# Patient Record
Sex: FEMALE | Race: WHITE | NOT HISPANIC OR LATINO | Employment: FULL TIME | URBAN - METROPOLITAN AREA
[De-identification: names, ages, dates, MRNs, and addresses within clinical notes are randomized per-mention and may not be internally consistent; named-entity substitution may affect disease eponyms.]

---

## 2017-04-25 ENCOUNTER — HISTORICAL (OUTPATIENT)
Dept: RADIOLOGY | Facility: HOSPITAL | Age: 52
End: 2017-04-25

## 2019-02-25 ENCOUNTER — HISTORICAL (OUTPATIENT)
Dept: ADMINISTRATIVE | Facility: HOSPITAL | Age: 54
End: 2019-02-25

## 2019-02-25 LAB
ABS NEUT (OLG): 11.7 X10(3)/MCL (ref 2.1–9.2)
ALBUMIN SERPL-MCNC: 4.4 GM/DL (ref 3.4–5)
ALBUMIN/GLOB SERPL: 1.57 {RATIO} (ref 1.5–2.5)
ALP SERPL-CCNC: 86 UNIT/L (ref 38–126)
ALT SERPL-CCNC: 11 UNIT/L (ref 7–52)
APPEARANCE, UA: CLEAR
AST SERPL-CCNC: 14 UNIT/L (ref 15–37)
BACTERIA #/AREA URNS AUTO: ABNORMAL /HPF
BILIRUB SERPL-MCNC: 0.4 MG/DL (ref 0.2–1)
BILIRUB UR QL STRIP: NEGATIVE MG/DL
BILIRUBIN DIRECT+TOT PNL SERPL-MCNC: 0.1 MG/DL (ref 0–0.5)
BILIRUBIN DIRECT+TOT PNL SERPL-MCNC: 0.3 MG/DL
BUN SERPL-MCNC: 14 MG/DL (ref 7–18)
CALCIUM SERPL-MCNC: 8.6 MG/DL (ref 8.5–10)
CHLORIDE SERPL-SCNC: 105 MMOL/L (ref 98–107)
CO2 SERPL-SCNC: 22 MMOL/L (ref 21–32)
COLOR UR: YELLOW
CREAT SERPL-MCNC: 0.86 MG/DL (ref 0.6–1.3)
ERYTHROCYTE [DISTWIDTH] IN BLOOD BY AUTOMATED COUNT: 14.1 % (ref 11.5–17)
GLOBULIN SER-MCNC: 2.8 GM/DL (ref 1.2–3)
GLUCOSE (UA): NEGATIVE MG/DL
GLUCOSE SERPL-MCNC: 103 MG/DL (ref 74–106)
HCT VFR BLD AUTO: 48 % (ref 37–47)
HGB BLD-MCNC: 15 GM/DL (ref 12–16)
HGB UR QL STRIP: ABNORMAL UNIT/L
KETONES UR QL STRIP: NEGATIVE MG/DL
LEUKOCYTE ESTERASE UR QL STRIP: NEGATIVE UNIT/L
LYMPHOCYTES # BLD AUTO: 2.3 X10(3)/MCL (ref 0.6–3.4)
LYMPHOCYTES NFR BLD AUTO: 16.5 % (ref 13–40)
MCH RBC QN AUTO: 29.5 PG (ref 27–31.2)
MCHC RBC AUTO-ENTMCNC: 31 GM/DL (ref 32–36)
MCV RBC AUTO: 94 FL (ref 80–94)
MONOCYTES # BLD AUTO: 0.2 X10(3)/MCL (ref 0.1–1.3)
MONOCYTES NFR BLD AUTO: 1.4 % (ref 0.1–24)
NEUTROPHILS NFR BLD AUTO: 82.1 % (ref 47–80)
NITRITE UR QL STRIP.AUTO: NEGATIVE
PH UR STRIP: 6 [PH]
PLATELET # BLD AUTO: 262 X10(3)/MCL (ref 130–400)
PMV BLD AUTO: 10 FL (ref 9.4–12.4)
POTASSIUM SERPL-SCNC: 3.8 MMOL/L (ref 3.5–5.1)
PROT SERPL-MCNC: 7.2 GM/DL (ref 6.4–8.2)
PROT UR QL STRIP: NEGATIVE MG/DL
RBC # BLD AUTO: 5.09 X10(6)/MCL (ref 4.2–5.4)
RBC #/AREA URNS HPF: ABNORMAL /HPF
SODIUM SERPL-SCNC: 138 MMOL/L (ref 136–145)
SP GR UR STRIP: 1.02
SQUAMOUS EPITHELIAL, UA: ABNORMAL /LPF
T4 FREE SERPL-MCNC: 0.78 NG/DL (ref 0.76–1.46)
TSH SERPL-ACNC: 1.66 MIU/ML (ref 0.35–4.94)
UROBILINOGEN UR STRIP-ACNC: 0.2 MG/DL
WBC # SPEC AUTO: 14.2 X10(3)/MCL (ref 4.5–11.5)
WBC #/AREA URNS AUTO: ABNORMAL /[HPF]

## 2019-03-12 ENCOUNTER — HISTORICAL (OUTPATIENT)
Dept: LAB | Facility: HOSPITAL | Age: 54
End: 2019-03-12

## 2019-03-12 ENCOUNTER — HISTORICAL (OUTPATIENT)
Dept: ADMINISTRATIVE | Facility: HOSPITAL | Age: 54
End: 2019-03-12

## 2019-03-12 LAB
ABS NEUT (OLG): 5.3 X10(3)/MCL (ref 2.1–9.2)
APPEARANCE, UA: ABNORMAL
BACTERIA #/AREA URNS AUTO: ABNORMAL /HPF
BILIRUB UR QL STRIP: NEGATIVE MG/DL
COLOR UR: YELLOW
ERYTHROCYTE [DISTWIDTH] IN BLOOD BY AUTOMATED COUNT: 14 % (ref 11.5–17)
GLUCOSE (UA): NEGATIVE MG/DL
HCT VFR BLD AUTO: 43.7 % (ref 37–47)
HGB BLD-MCNC: 14.5 GM/DL (ref 12–16)
HGB UR QL STRIP: ABNORMAL UNIT/L
KETONES UR QL STRIP: NEGATIVE MG/DL
LEUKOCYTE ESTERASE UR QL STRIP: NEGATIVE UNIT/L
LYMPHOCYTES # BLD AUTO: 2 X10(3)/MCL (ref 0.6–3.4)
LYMPHOCYTES NFR BLD AUTO: 26.6 % (ref 13–40)
MCH RBC QN AUTO: 29.7 PG (ref 27–31.2)
MCHC RBC AUTO-ENTMCNC: 33 GM/DL (ref 32–36)
MCV RBC AUTO: 89 FL (ref 80–94)
MONOCYTES # BLD AUTO: 0.3 X10(3)/MCL (ref 0.1–1.3)
MONOCYTES NFR BLD AUTO: 3.9 % (ref 0.1–24)
NEUTROPHILS NFR BLD AUTO: 69.5 % (ref 47–80)
NITRITE UR QL STRIP.AUTO: NEGATIVE
PH UR STRIP: 6.5 [PH]
PLATELET # BLD AUTO: 269 X10(3)/MCL (ref 130–400)
PMV BLD AUTO: 10.2 FL (ref 9.4–12.4)
PROT UR QL STRIP: NEGATIVE MG/DL
RBC # BLD AUTO: 4.89 X10(6)/MCL (ref 4.2–5.4)
RBC #/AREA URNS HPF: ABNORMAL /HPF
SP GR UR STRIP: 1.02
SQUAMOUS EPITHELIAL, UA: ABNORMAL /LPF
UROBILINOGEN UR STRIP-ACNC: 1 MG/DL
WBC # SPEC AUTO: 7.6 X10(3)/MCL (ref 4.5–11.5)
WBC #/AREA URNS AUTO: ABNORMAL /[HPF]

## 2019-03-14 LAB — FINAL CULTURE: NORMAL

## 2020-02-20 ENCOUNTER — HISTORICAL (OUTPATIENT)
Dept: ADMINISTRATIVE | Facility: HOSPITAL | Age: 55
End: 2020-02-20

## 2020-02-20 LAB
ABS NEUT (OLG): 5.7 X10(3)/MCL (ref 2.1–9.2)
ALBUMIN SERPL-MCNC: 4.6 GM/DL (ref 3.4–5)
ALBUMIN/GLOB SERPL: 1.77 {RATIO} (ref 1.5–2.5)
ALP SERPL-CCNC: 91 UNIT/L (ref 38–126)
ALT SERPL-CCNC: 10 UNIT/L (ref 7–52)
AST SERPL-CCNC: 13 UNIT/L (ref 15–37)
BILIRUB SERPL-MCNC: 0.4 MG/DL (ref 0.2–1)
BILIRUBIN DIRECT+TOT PNL SERPL-MCNC: 0.1 MG/DL (ref 0–0.5)
BILIRUBIN DIRECT+TOT PNL SERPL-MCNC: 0.3 MG/DL
BUN SERPL-MCNC: 16 MG/DL (ref 7–18)
CALCIUM SERPL-MCNC: 9.4 MG/DL (ref 8.5–10)
CHLORIDE SERPL-SCNC: 105 MMOL/L (ref 98–107)
CHOLEST SERPL-MCNC: 267 MG/DL (ref 0–200)
CHOLEST/HDLC SERPL: 5.3 {RATIO}
CO2 SERPL-SCNC: 27 MMOL/L (ref 21–32)
CREAT SERPL-MCNC: 1.03 MG/DL (ref 0.6–1.3)
ERYTHROCYTE [DISTWIDTH] IN BLOOD BY AUTOMATED COUNT: 13.7 % (ref 11.5–17)
GLOBULIN SER-MCNC: 2.6 GM/DL (ref 1.2–3)
GLUCOSE SERPL-MCNC: 91 MG/DL (ref 74–106)
HCT VFR BLD AUTO: 45.5 % (ref 37–47)
HDLC SERPL-MCNC: 50 MG/DL (ref 35–60)
HGB BLD-MCNC: 14.8 GM/DL (ref 12–16)
LDLC SERPL CALC-MCNC: 199 MG/DL (ref 0–129)
LYMPHOCYTES # BLD AUTO: 3.2 X10(3)/MCL (ref 0.6–3.4)
LYMPHOCYTES NFR BLD AUTO: 34.5 % (ref 13–40)
MCH RBC QN AUTO: 28.3 PG (ref 27–31.2)
MCHC RBC AUTO-ENTMCNC: 32 GM/DL (ref 32–36)
MCV RBC AUTO: 87 FL (ref 80–94)
MONOCYTES # BLD AUTO: 0.3 X10(3)/MCL (ref 0.1–1.3)
MONOCYTES NFR BLD AUTO: 3 % (ref 0.1–24)
NEUTROPHILS NFR BLD AUTO: 62.5 % (ref 47–80)
PLATELET # BLD AUTO: 247 X10(3)/MCL (ref 130–400)
PMV BLD AUTO: 9.8 FL (ref 9.4–12.4)
POTASSIUM SERPL-SCNC: 4.4 MMOL/L (ref 3.5–5.1)
PROT SERPL-MCNC: 7.2 GM/DL (ref 6.4–8.2)
RBC # BLD AUTO: 5.23 X10(6)/MCL (ref 4.2–5.4)
SODIUM SERPL-SCNC: 140 MMOL/L (ref 136–145)
T4 FREE SERPL-MCNC: 1.19 NG/DL (ref 0.76–1.46)
TRIGL SERPL-MCNC: 175 MG/DL (ref 30–150)
TSH SERPL-ACNC: 2.53 MIU/ML (ref 0.35–4.94)
VLDLC SERPL CALC-MCNC: 35 MG/DL
WBC # SPEC AUTO: 9.2 X10(3)/MCL (ref 4.5–11.5)

## 2021-04-07 ENCOUNTER — HISTORICAL (OUTPATIENT)
Dept: ADMINISTRATIVE | Facility: HOSPITAL | Age: 56
End: 2021-04-07

## 2021-04-07 LAB
ABS NEUT (OLG): 6.5 X10(3)/MCL (ref 2.1–9.2)
ALBUMIN SERPL-MCNC: 4.5 GM/DL (ref 3.4–5)
ALBUMIN/GLOB SERPL: 1.73 {RATIO} (ref 1.5–2.5)
ALP SERPL-CCNC: 91 UNIT/L (ref 38–126)
ALT SERPL-CCNC: 11 UNIT/L (ref 7–52)
APPEARANCE, UA: CLEAR
AST SERPL-CCNC: 13 UNIT/L (ref 15–37)
BACTERIA #/AREA URNS AUTO: ABNORMAL /HPF
BILIRUB SERPL-MCNC: 0.4 MG/DL (ref 0.2–1)
BILIRUB UR QL STRIP: NEGATIVE MG/DL
BILIRUBIN DIRECT+TOT PNL SERPL-MCNC: 0.1 MG/DL (ref 0–0.5)
BILIRUBIN DIRECT+TOT PNL SERPL-MCNC: 0.3 MG/DL
BUN SERPL-MCNC: 14 MG/DL (ref 7–18)
CALCIUM SERPL-MCNC: 9.7 MG/DL (ref 8.5–10)
CHLORIDE SERPL-SCNC: 105 MMOL/L (ref 98–107)
CHOLEST SERPL-MCNC: 268 MG/DL (ref 0–200)
CHOLEST/HDLC SERPL: 4.9 {RATIO}
CO2 SERPL-SCNC: 25 MMOL/L (ref 21–32)
COLOR UR: YELLOW
CREAT SERPL-MCNC: 0.98 MG/DL (ref 0.6–1.3)
ERYTHROCYTE [DISTWIDTH] IN BLOOD BY AUTOMATED COUNT: 13.1 % (ref 11.5–17)
GLOBULIN SER-MCNC: 2.6 GM/DL (ref 1.2–3)
GLUCOSE (UA): NEGATIVE MG/DL
GLUCOSE SERPL-MCNC: 85 MG/DL (ref 74–106)
HCT VFR BLD AUTO: 42 % (ref 37–47)
HDLC SERPL-MCNC: 55 MG/DL (ref 35–60)
HGB BLD-MCNC: 13.8 GM/DL (ref 12–16)
HGB UR QL STRIP: ABNORMAL UNIT/L
KETONES UR QL STRIP: NEGATIVE MG/DL
LDLC SERPL CALC-MCNC: 177 MG/DL (ref 0–129)
LEUKOCYTE ESTERASE UR QL STRIP: NEGATIVE UNIT/L
LYMPHOCYTES # BLD AUTO: 2.5 X10(3)/MCL (ref 0.6–3.4)
LYMPHOCYTES NFR BLD AUTO: 26.9 % (ref 13–40)
MCH RBC QN AUTO: 28.6 PG (ref 27–31.2)
MCHC RBC AUTO-ENTMCNC: 33 GM/DL (ref 32–36)
MCV RBC AUTO: 87 FL (ref 80–94)
MONOCYTES # BLD AUTO: 0.3 X10(3)/MCL (ref 0.1–1.3)
MONOCYTES NFR BLD AUTO: 3.4 % (ref 0.1–24)
NEUTROPHILS NFR BLD AUTO: 69.7 % (ref 47–80)
NITRITE UR QL STRIP.AUTO: NEGATIVE
PH UR STRIP: 5.5 [PH]
PLATELET # BLD AUTO: 241 X10(3)/MCL (ref 130–400)
PMV BLD AUTO: 9.4 FL (ref 9.4–12.4)
POTASSIUM SERPL-SCNC: 3.8 MMOL/L (ref 3.5–5.1)
PROT SERPL-MCNC: 7.1 GM/DL (ref 6.4–8.2)
PROT UR QL STRIP: NEGATIVE MG/DL
RBC # BLD AUTO: 4.82 X10(6)/MCL (ref 4.2–5.4)
RBC #/AREA URNS HPF: ABNORMAL /HPF
SODIUM SERPL-SCNC: 143 MMOL/L (ref 136–145)
SP GR UR STRIP: 1.02
SQUAMOUS EPITHELIAL, UA: ABNORMAL /LPF
T4 FREE SERPL-MCNC: 0.93 NG/DL (ref 0.76–1.46)
TRIGL SERPL-MCNC: 251 MG/DL (ref 30–150)
TSH SERPL-ACNC: 2.66 MIU/ML (ref 0.35–4.94)
UROBILINOGEN UR STRIP-ACNC: 0.2 MG/DL
VLDLC SERPL CALC-MCNC: 50.2 MG/DL
WBC # SPEC AUTO: 9.3 X10(3)/MCL (ref 4.5–11.5)
WBC #/AREA URNS AUTO: ABNORMAL /[HPF]

## 2021-05-28 ENCOUNTER — HISTORICAL (OUTPATIENT)
Dept: ADMINISTRATIVE | Facility: HOSPITAL | Age: 56
End: 2021-05-28

## 2022-04-10 ENCOUNTER — HISTORICAL (OUTPATIENT)
Dept: ADMINISTRATIVE | Facility: HOSPITAL | Age: 57
End: 2022-04-10

## 2022-04-26 VITALS
OXYGEN SATURATION: 98 % | BODY MASS INDEX: 24.22 KG/M2 | HEIGHT: 61 IN | DIASTOLIC BLOOD PRESSURE: 70 MMHG | WEIGHT: 128.31 LBS | SYSTOLIC BLOOD PRESSURE: 130 MMHG

## 2022-05-02 NOTE — HISTORICAL [facility] CERNER
This is a historical note converted from Hunter. Formatting and pictures may have been removed.  Please reference Hunter for original formatting and attached multimedia. Chief Complaint  Annual wellness physical- NPO  History of Present Illness  Pt presents for Wellness exam.  She has been feeling well until today when she developed sinus pressure.  Sleep: she wakes up multiple times.  Appetite is good.  She does not exercise.  She works at [employer].  She is  with 2 children: 18 and 9.  She has alcohol 1-2 times a year.  + tobacco: 1/2-1 pack per day.  She has 2 cups of coffee a day.  Gyn: Dr Gaines, last saw him 1 year ago. Last mammogram: 3 years ago. History of fibrocystic disease.  She has not had a colonoscopy yet. Her mother had colon cancer.  Review of Systems  Constitutional:?no?weight gain,?no?weight loss,?+?fatigue, ?no?fever, ?no?chills, ?no?weakness, ?+?trouble sleeping, + hot/cold, has hot flashes, had night sweats 6 months ago  Eyes: ?no?vision loss/changes,?no?glasses or contacts,??no?pain,??no?redness,??no?blurry or double vision,??no?flashing lights,??no?glaucoma,??no?cataracts  Last eye exam:?has been a while  Neck: ?no?lymphadenopathy,??no?thyroid abnormalities,??no?bruits,??no?stiffness  Ears:?no?decreased hearing,??no?tinnitus,??no?earache,??no?drainage?  Nose:?+?congestion,??no?rhinorrhea,??no?epistaxis,??+?sinus pressure: started a few hours ago  Throat/Oral:?no?sore throat,??no?hoarseness, ?no?dental caries,??no?gum bleeding,??no?oral lesions  Cardiovascular:?no?chest pain, palpitations, or tightness,?no?dyspnea with exertion,??no?orthopnea,??no?paroxysmal nocturnal dyspnea  Respiratory:?no?cough,?no?sputum,??no?hemoptysis,??no?dyspnea,??no?wheezing,??no?pleuritic chest pain?  Gastrointestinal:?no?abdominal pain,?no?nausea,?no?vomiting,??no?heartburn,??no?dysphagia or  odynophagia,??no?diarrhea,??no?constipation,??no?melena,??no?hematochezia,?no?jaundice  Urinary:?no?frequency,??no?urgency,??no?burning or pain,?no?hematuria,??+?incontinence: stress,??no?hesitancy,??no?incomplete voiding,??no?flank pain,??no?nocturia  Musculoskeletal:?no?myalgias,?no?arthralgias,?+?neck pain: tightness, occasionally when she wakes up in am,??no?back pain,??no?swelling of extremities  Skin:?no?rashes,?no?sores,?no?non-healing wounds  Neurologic:?+?headaches: daily,?she takes BC powder every other day, ?no?dizziness/lightheadedness,?no?tremors,?no?paresthesias,??no?seizures,??no?muscle weakness  Psychiatric:?no?depression/sadness,?no?anhedonia,?no?irritability,?no?suicidal ideations,?no?anxiety,?no?panic attacks  Endocrine:?no?hot or cold intolerance,?no?sweating,?no?polyuria,?no?polydipsia,?no?polyphagia  Hematologic:?no?bruising,??no?bleeding disorders?  Physical Exam  Vitals & Measurements  T:?36.8? ?C (Oral)? HR:?80(Peripheral)? BP:?180/110? SpO2:?97%?  HT:?154?cm? WT:?60.1?kg? LMP:?01/01/2020 00:00 CST?  PHYSICAL EXAMINATION:  GENERAL: The patient is a well-developed, well-nourished female in no?apparent distress. She is alert and oriented x 4. She was tearful for a while.  HEENT: Head is normocephalic and atraumatic. Extraocular muscles are intact. Pupils are equal, round, and reactive to light and accommodation. Nares appeared normal. Mouth is well hydrated and without lesions. Mucous membranes are moist. Posterior pharynx clear of any exudate or lesions. ?Bilateral ears with cerumen impaction.  NECK: Supple. No carotid bruits.? No lymphadenopathy or thyromegaly. ?Increased tonicity over her neck and upper back muscles.  LUNGS: Clear to auscultation.  HEART: Regular rate and rhythm without murmurs, rubs or gallops.  ABDOMEN: Soft, nontender, and nondistended.? Positive bowel sounds.? No hepatosplenomegaly was noted.  EXTREMITIES: Without any cyanosis, clubbing, rash, lesions or  edema.  NEUROLOGIC: Cranial nerves II through XII are grossly intact.? No motor or sensory deficits.? Cerebellar function intact.  SKIN: No ulceration or induration present.  ?  ?  Assessment/Plan  1.?Wellness examination?Z00.00  She presents for wellness examination.  She admits to being under a lot of stress?and feeling overwhelmed at times.? She is menopausal.  Order sent for patient to have mammogram at?[facility]; history of fibrocystic breast disease.  Will refer for screening colonoscopy; she has not had one. ?We will refer her after her blood pressure is controlled.  Tdap 0.5 IM administered.  Patient advised to consider Shingrix vaccination.  Patient advised to?be more physically active and exercise.  Ordered:  Clinic Follow-Up Wellness, *Est. 02/20/21 3:00:00 CST, Order for future visit, Wellness examination, [facility]  Comprehensive Metabolic Panel, Routine collect, 02/20/20 15:08:00 CST, Blood, Stop date 02/20/20 15:08:00 CST, Lab Collect, Wellness examination  Elevated blood pressure reading  Fatigue, 02/20/20 15:08:00 CST  Lipid Panel, Routine collect, 02/20/20 15:08:00 CST, Blood, Stop date 02/20/20 15:08:00 CST, Lab Collect, Wellness examination  Elevated blood pressure reading  Fatigue, 02/20/20 15:08:00 CST  Preventative Health Care Est 40-64 years [id number] PC, Wellness examination  Depression  Elevated blood pressure reading  Fatigue  Cerumen impaction, [facility] - [facility], 02/20/20 15:03:00 CST  ?  2.?Depression?F32.9  She said?her mood is stable for the most part; refills for Wellbutrin sent for 1 year.  Ordered:  Preventative Health Care Est 40-64 years [id number] PC, Wellness examination  Depression  Elevated blood pressure reading  Fatigue  Cerumen impaction, [facility] - [facility], 02/20/20 15:03:00 CST  ?  3.?Elevated blood pressure reading?R03.0  Both of her parents and her sister?take blood pressure medication. ?Her sister has been on blood pressure medicine she is in her 30s.  Patient was  told at a health fair last year that her blood pressure?was?elevated.? She has never been on?blood pressure medication.  I advised patient?due to level of?her blood pressure elevation?I believe it would be a good idea to start?blood pressure medication; she is in agreement.  Risk of uncontrolled hypertension discussed with patient including?heart disease, stroke, renal disease, loss of vision?and even death.  Start lisinopril HCT 10/12.5: 1 p.o.?daily in the morning.  Patient is to?check her blood pressures twice a day and bring her readings?to her next office visit.  Patient advised to follow a low?sodium diet.  Ordered:  Clinic Follow up, *Est. 03/12/20 16:15:00 CDT, Order for future visit, Elevated blood pressure reading, [facility]  Comprehensive Metabolic Panel, Routine collect, 02/20/20 15:08:00 CST, Blood, Stop date 02/20/20 15:08:00 CST, Lab Collect, Wellness examination  Elevated blood pressure reading  Fatigue, 02/20/20 15:08:00 CST  Lipid Panel, Routine collect, 02/20/20 15:08:00 CST, Blood, Stop date 02/20/20 15:08:00 CST, Lab Collect, Wellness examination  Elevated blood pressure reading  Fatigue, 02/20/20 15:08:00 CST  Office/Outpatient Visit Level 3 Established [id number] PC, Elevated blood pressure reading, [facility] AMB - [facility], 02/20/20 15:05:00 CST  Preventative Health Care Est 40-64 years [id number] PC, Wellness examination  Depression  Elevated blood pressure reading  Fatigue  Cerumen impaction, [facility] AMB - [facility], 02/20/20 15:03:00 CST  ?  4.?Immunization due?Z23  ?Tdap 0.5 IM administered.  ?  5.?Cerumen impaction?H61.20  ?Bilateral;?removed via?curettage and flushing.  Examination?post cerumen removal and removal reveals some mild?canal erythema on the left.  Ordered:  Cerumen removal - removal [id number] PC, 02/20/20 14:22:00 CST, [facility] AMB - [facility], 02/20/20 14:22:00 CST, Cerumen impaction  Preventative Health Care Est 40-64 years [id number] PC, Wellness examination  Depression  Elevated blood pressure reading   Fatigue  Cerumen impaction, [facility] - [facility], 02/20/20 15:03:00 CST  ?  6.?Fatigue?R53.83  Patient is under a lot of stress.? She is not getting of sleep.  She does not exercise.  I believe her fatigue is multifactorial.  I told her?we need to get blood pressure under control.  She needs??be more physically active and?exercise.  Labs pending.  Ordered:  Comprehensive Metabolic Panel, Routine collect, 02/20/20 15:08:00 CST, Blood, Stop date 02/20/20 15:08:00 CST, Lab Collect, Wellness examination  Elevated blood pressure reading  Fatigue, 02/20/20 15:08:00 CST  Free T4, Routine collect, 02/20/20 15:08:00 CST, Blood, Stop date 02/20/20 15:08:00 CST, Lab Collect, Fatigue, 02/20/20 15:08:00 CST  Lipid Panel, Routine collect, 02/20/20 15:08:00 CST, Blood, Stop date 02/20/20 15:08:00 CST, Lab Collect, Wellness examination  Elevated blood pressure reading  Fatigue, 02/20/20 15:08:00 CST  Preventative Health Care Est 40-64 years [id number] PC, Wellness examination  Depression  Elevated blood pressure reading  Fatigue  Cerumen impaction, [facility] AMB - [facility], 02/20/20 15:03:00 CST  Thyroid Stimulating Hormone, Routine collect, 02/20/20 15:08:00 CST, Blood, Stop date 02/20/20 15:08:00 CST, Lab Collect, Fatigue, 02/20/20 15:08:00 CST  ?  7.?Visit for screening mammogram?Z12.31  Order?for screening mammogram sent to?[facility].  Ordered:  MG Screening Bilateral, Routine, 02/20/20 14:58:00 CST, Other (please specify), mmg screening, None, Ambulatory, [facility] Screening Please call pt to schedule, Rad Type, Order for future visit, Visit for screening mammogram, Schedule this test, [facility]...  Schedule Diagnostics Study, screening mammogram, next available [facility], 02/20/20 14:12:00 CST, Visit for screening mammogram  ?  8.?Tobacco use?Z72.0  ?Patient encouraged to quit smoking; risk of?continued smoking discussed with patient.  I also advised her that?smoking cessation would help to lower her blood  pressure.  ?  Orders:  buPROPion, 300 mg = 1 tab(s), Oral, Daily, # 30 tab(s), 11 Refill(s), [facility]: [id number], 154, cm, Height/Length Dosing, 02/20/20 13:42:00 CST, 60.1, kg, Weight Dosing, 02/20/20 13:42:00 CST  Referrals  Clinic Follow up, *Est. 03/12/20 16:15:00 CDT, Order for future visit, Elevated blood pressure reading, [facility]  Clinic Follow-Up Wellness, *Est. 02/20/21 3:00:00 CST, Order for future visit, Wellness examination, [facility]   Problem List/Past Medical History  Ongoing  Depression  Fatigue  Historical  No qualifying data  Procedure/Surgical History  None   Medications  Wellbutrin  mg/24 hours oral tablet, extended release, 300 mg= 1 tab(s), Oral, Daily, 11 refills  Allergies  No Known Medication Allergies  Social History  Abuse/Neglect  No, 02/20/2020  Alcohol  Current, 1-2 times per year, 02/25/2019  Employment/School  Employed, Work/School description: [employer] 02/25/2019  Exercise  Home/Environment  Lives with Children, Spouse. Living situation: Home/Independent., 02/25/2019  Nutrition/Health  Regular, Caffeine intake amount: 2 CUPS OF COFFEE PER DAY., 02/25/2019  Substance Use  Never, 02/25/2019  Tobacco  10 or more cigarettes (1/2 pack or more)/day in last 30 days, Cigarettes, N/A, 20 per day. 27 Years (Age started)., 02/20/2020  Family History  CVA - Cerebrovascular accident: Mother.  Depression: Mother, Daughter and Uncle.  Hypertension: Sister.  Polio: Brother.  Smoker: Mother.  Immunizations  Vaccine Date Status   tetanus/diphtheria/pertussis, acel(Tdap) 02/20/2020 Given   Health Maintenance  Health Maintenance  ???Pending?(in the next year)  ??? ??OverDue  ??? ? ? ?Diabetes Screening due??and every?  ??? ? ? ?Breast Cancer Screening due??04/25/19??and every 2??year(s)  ??? ??Due?  ??? ? ? ?Alcohol Misuse Screening due??01/01/20??and every 1??year(s)  ??? ? ? ?Obesity Screening due??01/01/20??and every 1??year(s)  ??? ? ? ?ADL Screening  due??02/20/20??and every 1??year(s)  ??? ? ? ?Colorectal Screening due??02/20/20??and every?  ??? ??Due In Future?  ??? ? ? ?Lipid Screening not due until??04/07/20??and every 5??year(s)  ??? ? ? ?Cervical Cancer Screening not due until??12/20/20??and every 3??year(s)  ??? ? ? ?Blood Pressure Screening not due until??02/19/21??and every 1??year(s)  ??? ? ? ?Body Mass Index Check not due until??02/19/21??and every 1??year(s)  ???Satisfied?(in the past 1 year)  ??? ??Satisfied?  ??? ? ? ?Blood Pressure Screening on??02/20/20.??Satisfied by Carina Christian LPN  ??? ? ? ?Body Mass Index Check on??02/25/19.??Satisfied by Carina Christian LPN  ??? ? ? ?Diabetes Screening on??02/25/19.??Satisfied by Saran Diaz  ??? ? ? ?Influenza Vaccine on??02/20/20.??Satisfied by Carina Christian LPN  ??? ? ? ?Obesity Screening on??02/25/19.??Satisfied by Carina Christian LPN  ??? ? ? ?Tetanus Vaccine on??02/20/20.??Satisfied by Carina Christian LPN  ?

## 2022-05-02 NOTE — HISTORICAL [facility] CERNER
This is a historical note converted from Hunter. Formatting and pictures may have been removed.  Please reference Hunter for original formatting and attached multimedia. Chief Complaint  EST. CARE- DEPRESSION; POSITIONAL TINGLING TO RIGHT ARM/HAND. NPO  History of Present Illness  She is here to? discuss her medications. She feels she is lacking emotions. She doesnt feels things like she should. She states things do not affect like they should. She has been taking this medication for depression for at least 5 years. She lacks motivation. She reports fatigue. She wakes up twice at night but goes right back to sleep. She has more appetite at night. She has crying spells but not like she should. She feels sad. She feels helpless and hopeless at times. Denies anxiety. She feels useless. + anhedonia.  Review of Systems  See HPI.  Physical Exam  Vitals & Measurements  BP:?140/78?  HT:?154?cm? WT:?58.1?kg? BMI:?24.5?  General: wd, wn white female in no apparent distress. She is alert and oriented and has an appropriate demeanor. Speech is clear.  CV: reg s mrg  Chest: cta bilaterally  Assessment/Plan  1.?Depression?F32.9  Pt is?without normal emotions and feelings on?Escitalopram. Wean off Escitalopram.  Start Wellbutrin  mg daily.  Ordered:  Clinic Follow up, *Est. 03/25/19 15:45:00 CDT, Order for future visit, Depression, [facility]  Office/Outpatient Visit Level 3 New [id number] PC, Depression  Fatigue, [facility] - [facility], 02/25/19 11:40:00 CST  ?  2.?Fatigue?R53.83  ?Check labs.  Ordered:  Office/Outpatient Visit Level 3 New [id number] PC, Depression  Fatigue, [facility] - [facility], 02/25/19 11:40:00 CST  ?  Orders:  buPROPion, 150 mg = 1 tab(s), Oral, q24hr, # 30 tab(s), 1 Refill(s), [facility]: [id number]  escitalopram, 10 mg = 2 tab(s), Oral, Daily, # 14 tab(s), 0 Refill(s), Pharmacy: [id number]   Problem List/Past Medical History  Ongoing  Depression  Fatigue  Historical  No qualifying  data  Medications  escitalopram 5 mg oral tablet, 10 mg= 2 tab(s), Oral, Daily  Wellbutrin  mg/24 hours oral tablet, extended release, 150 mg= 1 tab(s), Oral, q24hr, 1 refills  Allergies  No Known Medication Allergies  Social History  Alcohol  Current, 1-2 times per year, 02/25/2019  Employment/School  Employed, Work/School description: [employer] 02/25/2019  Exercise  Home/Environment  Lives with Children, Spouse. Living situation: Home/Independent., 02/25/2019  Nutrition/Health  Regular, Caffeine intake amount: 2 CUPS OF COFFEE PER DAY., 02/25/2019  Substance Abuse  Never, 02/25/2019  Tobacco  10 or more cigarettes (1/2 pack or more)/day in last 30 days, Cigarettes, No, Started age 27 Years., 02/25/2019  Family History  CVA - Cerebrovascular accident: Mother.  Depression: Mother, Daughter and Uncle.  Hypertension: Sister.  Polio: Brother.  Smoker: Mother.  Health Maintenance  Health Maintenance  ???Pending?(in the next year)  ??? ??Due?  ??? ? ? ?ADL Screening due??02/25/19??and every 1??year(s)  ??? ? ? ?Alcohol Misuse Screening due??02/25/19??and every 1??year(s)  ??? ? ? ?Tetanus Vaccine due??02/25/19??and every 10??year(s)  ???Satisfied?(in the past 1 year)  ??? ??Satisfied?  ??? ? ? ?Blood Pressure Screening on??02/25/19.??Satisfied by Carina Christian LPN  ??? ? ? ?Body Mass Index Check on??02/25/19.??Satisfied by Carina Christian LPN  ??? ? ? ?Diabetes Screening on??02/25/19.??Satisfied by Saran Diaz  ??? ? ? ?Influenza Vaccine on??02/25/19.??Satisfied by Carina Christian LPN  ??? ? ? ?Obesity Screening on??02/25/19.??Satisfied by Carina Christian LPN  ?  ?

## 2022-05-02 NOTE — HISTORICAL [facility]
This is a historical note converted from [facility]. Formatting and pictures may have been removed.  Please reference Hunter for original formatting and attached multimedia. Chief Complaint  ANNUAL WELLNESS-NON=-FASTING  History of Present Illness  Pt presents for Wellness exam.  She has been feeling well.  She has problems falling asleep.  She has been more time than normal. She has been feeling depressed and forgetful.  She does not eat much during the day; she eats at night.  She does not exercise.  She works at [employer].  She is  with 2 children: 19 and 10.  She has alcohol 1-2 times a year.  + tobacco: 1/2-1 pack per day. She has thought of quitting smoking. She has tried the patches without benefit. She takes Wellbutrin but that has not helped her quite smoking.  She has 2 cups of coffee a day.  GYN: Dr Gaines, last saw him?2 years ago. Last mammogram:?4 years ago. History of fibrocystic disease.  She has not had a colonoscopy yet. Her mother had colon cancer.  Review of Systems  Constitutional:?no?weight gain,?no?weight loss,?+?fatigue, ?no?fever, ?no?chills, ?no?weakness, ?+?trouble sleeping, + hot/cold, has hot flashes, + night sweats  Eyes: ?no?vision loss/changes,?no?glasses or contacts,??no?pain,??no?redness,??no?blurry or double vision,??no?flashing lights,??no?glaucoma,??no?cataracts  Last eye exam:? has been a while  Neck: ?no?lymphadenopathy,??no?thyroid abnormalities,??no?bruits,??no?stiffness  Ears:?no?decreased hearing,??no?tinnitus,??no?earache,??no?drainage?  Nose:?+?congestion,??no?rhinorrhea,??no?epistaxis,??+?sinus pressure  Throat/Oral:?no?sore throat,??no?hoarseness, ?no?dental caries,??no?gum bleeding,??no?oral lesions  Cardiovascular:?no?chest pain, palpitations, or tightness,?+?dyspnea with exertion, she?gets short of breath easier than before,??no?orthopnea,??no?paroxysmal nocturnal dyspnea  Respiratory:?+?cough with sinuses,  ?no?sputum,??no?hemoptysis,??no?dyspnea,??no?wheezing,??no?pleuritic chest pain?  Gastrointestinal:?no?abdominal pain,?no?nausea,?no?vomiting,??no?heartburn,??no?dysphagia or odynophagia,??no?diarrhea,??no?constipation,??no?melena,??no?hematochezia,?no?jaundice  Urinary:?no?frequency,??no?urgency,??no?burning or pain,?no?hematuria,??+?incontinence: stress,??no?hesitancy,??no?incomplete voiding,??no?flank pain,??no?nocturia  Musculoskeletal:?no?myalgias,?no?arthralgias,?+?neck pain: tightness, has soreness with turning head, occasionally when she wakes up in am,??no?back pain,??no?swelling of extremities  Skin:?no?rashes,?no?sores,?no?non-healing wounds  Neurologic:?+?headaches: behind her eyes,?she does not take BC powder like she used to, ?no?dizziness/lightheadedness,?no?tremors,?no?paresthesias,??no?seizures,??no?muscle weakness  Psychiatric:? +?depression/sadness, she has had lack of interest, + crying spells, she denies?any feelings of helplessness or hopelessness, ?no?anhedonia,?no?irritability,?no?suicidal ideations,?+?anxiety, she feels more paranoid lately,?no?panic attacks  Endocrine:?no?hot or cold intolerance,?no?sweating,?no?polyuria,?no?polydipsia,?no?polyphagia  Hematologic:?no?bruising,??no?bleeding disorders?  Physical Exam  Vitals & Measurements  T:?36.8? ?C (Oral)? HR:?73(Peripheral)? BP:?132/88? SpO2:?98%?  HT:?154.94?cm? HT:?154.94?cm? WT:?59.8?kg? WT:?59.800?kg? BMI:?24.91?  ?  PHYSICAL EXAMINATION:  GENERAL: The patient is a well-developed, well-nourished female in no?apparent distress. She is alert and oriented x 4.  HEENT: Head is normocephalic and atraumatic. Extraocular muscles are intact. Pupils are equal, round, and reactive to light and accommodation. Nares appeared normal. Mouth is well hydrated and without lesions. Mucous membranes are moist. Posterior pharynx clear of any exudate or lesions.  NECK: Supple. No carotid bruits.? No lymphadenopathy or thyromegaly.  LUNGS: Clear to  auscultation.  HEART: Regular rate and rhythm without murmur, gallops or rubs.  ABDOMEN: Soft, nontender, and nondistended.? Positive bowel sounds.? No hepatosplenomegaly was noted.  EXTREMITIES: Without any cyanosis, clubbing, rash, lesions or edema.  NEUROLOGIC: Cranial nerves II through XII are grossly intact.? No motor or sensory deficits.? Cerebellar function intact.  SKIN: No ulceration or induration present.  ?  ?  Assessment/Plan  1.?Wellness examination?Z00.00  Patient presents for wellness examination.  She reports fatigue?and increased depression recently.  She continues to smoke.? She reports?dyspnea on exertion.  She has not done a mammogram in some time.? She will?call and make an appointment with Dr. Gaines?for her checkup and have them schedule an appointment for mammogram.  She is agreeable to a colonoscopy; she has never had one.? We will refer her for?one.  Patient encouraged to increase physical activity and exercise.?  Labs pending.  Ordered:  Automated Diff, Routine collect, 04/07/21 16:43:00 CDT, Blood, Collected, Stop date 04/07/21 16:43:00 CDT, Lab Collect, Wellness examination  Hypertension  Fatigue, 04/07/21 16:43:00 CDT  CBC w/ Auto Diff, Routine collect, 04/07/21 16:43:00 CDT, Blood, Stop date 04/07/21 16:43:00 CDT, Lab Collect, Wellness examination  Hypertension  Fatigue, 04/07/21 16:43:00 CDT  Clinic Follow-Up Wellness, *Est. 04/07/22 3:00:00 CDT, Order for future visit, Wellness examination, [facility]  Comprehensive Metabolic Panel, Routine collect, 04/07/21 16:43:00 CDT, Blood, Stop date 04/07/21 16:43:00 CDT, Lab Collect, Wellness examination  Hypertension  Fatigue, 04/07/21 16:43:00 CDT  Lipid Panel, Routine collect, 04/07/21 16:43:00 CDT, Blood, Stop date 04/07/21 16:43:00 CDT, Lab Collect, Wellness examination  Hypertension, 04/07/21 16:43:00 CDT  Preventative Health Care Est 40-64 years [id number] PC, Wellness examination  Hypertension  Depression  Tobacco user  Dyspnea  on exertion  Fatigue  Immunization due, [facility] - [facility], 04/07/21 16:33:00 CDT  Urinalysis no Reflex, Routine collect, Urine, 04/07/21 16:43:00 CDT, Stop date 04/07/21 16:43:00 CDT, Nurse collect, Wellness examination  ?  2.?Hypertension?I10  ?Well-controlled; continue current?medication.? Refills sent.  Ordered:  Automated Diff, Routine collect, 04/07/21 16:43:00 CDT, Blood, Collected, Stop date 04/07/21 16:43:00 CDT, Lab Collect, Wellness examination  Hypertension  Fatigue, 04/07/21 16:43:00 CDT  CBC w/ Auto Diff, Routine collect, 04/07/21 16:43:00 CDT, Blood, Stop date 04/07/21 16:43:00 CDT, Lab Collect, Wellness examination  Hypertension  Fatigue, 04/07/21 16:43:00 CDT  Comprehensive Metabolic Panel, Routine collect, 04/07/21 16:43:00 CDT, Blood, Stop date 04/07/21 16:43:00 CDT, Lab Collect, Wellness examination  Hypertension  Fatigue, 04/07/21 16:43:00 CDT  Lipid Panel, Routine collect, 04/07/21 16:43:00 CDT, Blood, Stop date 04/07/21 16:43:00 CDT, Lab Collect, Wellness examination  Hypertension, 04/07/21 16:43:00 CDT  Preventative Health Care Est 40-64 years [id number] PC, Wellness examination  Hypertension  Depression  Tobacco user  Dyspnea on exertion  Fatigue  Immunization due, [facility] - AFP, 04/07/21 16:33:00 CDT  ?  3.?Depression?F32.9  ?Patient has been with some depression recently.  Continue Wellbutrin at current dosage.  Add Lexapro 10 mg daily; take 1 tablet in evening.  Ordered:  Clinic Follow up, *Est. 05/07/21 3:00:00 CDT, Order for future visit, Depression, [facility]  Preventative Health Care Est 40-64 years [address]10 PC, Wellness examination  Hypertension  Depression  Tobacco user  Dyspnea on exertion  Fatigue  Immunization due, [facility] AM[facility] - AFP, 04/07/21 16:33:00 CDT  ?  4.?Tobacco user?Z72.0  Patient encouraged to decrease/stop usage.  Chantix would be a consideration; I would like?her mood to be better prior to?patient possibly taking it.  Patient advised of long-term risk of  continued tobacco usage.  Ordered:  [facility] Health Care Est 40-64 years [id number] PC, Wellness examination  Hypertension  Depression  Tobacco user  Dyspnea on exertion  Fatigue  Immunization due, [facility] AMB - AFP, 04/07/21 16:33:00 CDT  ?  5.?Dyspnea on exertion?R06.00  ?Patient encouraged to quit smoking.  We will do a spirometry when patient returns in 1 month.  Ordered:  [facility] Care Est 40-64 years [address]15 PC, Wellness examination  Hypertension  Depression  Tobacco user  Dyspnea on exertion  Fatigue  Immunization due, [facility] AMB - AFP, 04/07/21 16:33:00 CDT  ?  6.?Fatigue?R53.83  Patient has chronic fatigue.  She may not be getting enough sleep?and she is not physically active.??Depression and?anxiety are also contributing factors.  Ordered:  Automated Diff, Routine collect, 04/07/21 16:43:00 CDT, Blood, Collected, Stop date 04/07/21 16:43:00 CDT, Lab Collect, Wellness examination  Hypertension  Fatigue, 04/07/21 16:43:00 CDT  CBC w/ Auto Diff, Routine collect, 04/07/21 16:43:00 CDT, Blood, Stop date 04/07/21 16:43:00 CDT, Lab Collect, Wellness examination  Hypertension  Fatigue, 04/07/21 16:43:00 CDT  Comprehensive Metabolic Panel, Routine collect, 04/07/21 16:43:00 CDT, Blood, Stop date 04/07/21 16:43:00 CDT, Lab Collect, Wellness examination  Hypertension  Fatigue, 04/07/21 16:43:00 CDT  Free T4, Routine collect, 04/07/21 16:43:00 CDT, Blood, Stop date 04/07/21 16:43:00 CDT, Lab Collect, Fatigue, 04/07/21 16:43:00 CDT  [facility] Care Est 40-64 years [id number] PC, Wellness examination  Hypertension  Depression  Tobacco user  Dyspnea on exertion  Fatigue  Immunization due, [facility] AMB - AFP, 04/07/21 16:33:00 CDT  Thyroid Stimulating Hormone, Routine collect, 04/07/21 16:43:00 CDT, Blood, Stop date 04/07/21 16:43:00 CDT, Lab Collect, Fatigue, 04/07/21 16:43:00 CDT  ?  7.?Immunization due?Z23  ?Shingrix 0.5 IM administered; benefits/potential risks/side effects discussed with  patient.  Patient advised she will need a second Shingrix vaccine in 2 to 6 months.  Ordered:  [facility] Health Care Est 40-64 years [address] PC, Wellness examination  Hypertension  Depression  Tobacco user  Dyspnea on exertion  Fatigue  Immunization due, [facility], 04/07/21 16:33:00 CDT  ?  Orders:  buPROPion, 300 mg = 1 tab(s), Oral, Daily, # 30 tab(s), 11 Refill(s), Pharmacy: [facility], 154.94, cm, Height/Length Dosing, 04/07/21 15:38:00 CDT, 59.8, kg, Weight Dosing, 04/07/21 15:32:00 CDT  escitalopram, 10 mg = 1 tab(s), Oral, Daily, # 30 tab(s), 1 Refill(s), Pharmacy: [facility], 154.94, cm, Height/Length Dosing, 04/07/21 15:38:00 CDT, 59.8, kg, Weight Dosing, 04/07/21 15:32:00 CDT  hydrochlorothiazide-lisinopril, 1 tab(s), Oral, Daily, # 30 tab(s), 11 Refill(s), Pharmacy: [facility], 154.94, cm, Height/Length Dosing, 04/07/21 15:38:00 CDT, 59.8, kg, Weight Dosing, 04/07/21 15:32:00 CDT  Referrals  Clinic Follow up, *Est. 05/07/21 3:00:00 CDT, Order for future visit, Depression, [facility]  Clinic Follow-Up Wellness, *Est. 04/07/22 3:00:00 CDT, Order for future visit, Wellness examination, [facility]   Problem List/Past Medical History  Ongoing  Depression  Dyspnea on exertion  Fatigue  Hypertension  Historical  No qualifying data  Procedure/Surgical History  None   Medications  hydrochlorothiazide-lisinopril 12.5 mg-10 mg oral tablet, 1 tab(s), Oral, Daily, 11 refills  Lexapro 10 mg oral tablet, 10 mg= 1 tab(s), Oral, Daily, 1 refills  Wellbutrin  mg/24 hours oral tablet, extended release, 300 mg= 1 tab(s), Oral, Daily, 11 refills  Allergies  No Known Medication Allergies  Social History  Abuse/Neglect  No, 04/07/2021  No, 03/26/2020  No, 03/12/2020  No, 02/20/2020  Alcohol  Current, Beer, 1-2 times per month, 04/07/2021  Current, 1-2 times per year, 02/25/2019  Employment/School  Employed, Work/School description: [employer], 04/07/2021  Employed, Work/School description:  [employer]., 02/25/2019  Exercise  Home/Environment  Lives with Children, Spouse. Living situation: Home/Independent., 04/07/2021  Lives with Children, Spouse. Living situation: Home/Independent., 02/25/2019  Nutrition/Health  Regular, Good, 04/07/2021  Regular, Caffeine intake amount: 2 CUPS OF COFFEE PER DAY., 02/25/2019  Substance Use  Never, 04/07/2021  Never, 02/25/2019  Tobacco  10 or more cigarettes (1/2 pack or more)/day in last 30 days, No, 04/07/2021  10 or more cigarettes (1/2 pack or more)/day in last 30 days, Cigarettes, No, 20 per day. 27 Years (Age started)., 03/26/2020  10 or more cigarettes (1/2 pack or more)/day in last 30 days, Cigarettes, No, 20 per day. 27 Years (Age started)., 03/12/2020  10 or more cigarettes (1/2 pack or more)/day in last 30 days, Cigarettes, N/A, 20 per day. 27 Years (Age started)., 02/20/2020  Family History  CVA - Cerebrovascular accident: Mother.  Depression: Mother, Daughter and Uncle.  Hypertension: Sister.  Polio: Brother.  Smoker: Mother.  Immunizations  Vaccine Date Status   zoster vaccine, inactivated 04/07/2021 Given   tetanus/diphtheria/pertussis, acel(Tdap) 02/20/2020 Given   Health Maintenance  Health Maintenance  ???Pending?(in the next year)  ??? ??OverDue  ??? ? ? ?Influenza Vaccine due??10/01/20??and every 1??day(s)  ??? ? ? ?Cervical Cancer Screening due??12/20/20??and every 3??year(s)  ??? ? ? ?Smoking Cessation due??01/01/21??Variable frequency  ??? ? ? ?Alcohol Misuse Screening due??01/02/21??and every 1??year(s)  ??? ? ? ?Hypertension Management-BMP due??02/19/21??and every 1??year(s)  ??? ??Due?  ??? ? ? ?ADL Screening due??04/07/21??and every 1??year(s)  ??? ? ? ?Colorectal Screening due??04/07/21??Unknown Frequency  ??? ? ? ?Hypertension Maintenance-Medication Prescribed due??04/07/21??and every 1??year(s)  ??? ? ? ?Hypertension Management-Education due??04/07/21??and every 1??year(s)  ??? ??Due In Future?  ??? ? ? ?Obesity Screening not due  until??01/01/22??and every 1??year(s)  ??? ? ? ?Breast Cancer Screening not due until??03/05/22??and every 2??year(s)  ???Satisfied?(in the past 1 year)  ??? ??Satisfied?  ??? ? ? ?Blood Pressure Screening on??04/07/21.??Satisfied by Maximo Solo LPN  ??? ? ? ?Body Mass Index Check on??04/07/21.??Satisfied by Maximo Solo LPN  ??? ? ? ?Hypertension Management-Blood Pressure on??04/07/21.??Satisfied by Maximo Solo LPN  ??? ? ? ?Obesity Screening on??04/07/21.??Satisfied by Maximo Solo LPN  ?

## 2022-06-07 RX ORDER — LISINOPRIL AND HYDROCHLOROTHIAZIDE 10; 12.5 MG/1; MG/1
TABLET ORAL
Qty: 30 TABLET | Refills: 0 | Status: SHIPPED | OUTPATIENT
Start: 2022-06-07 | End: 2022-06-08

## 2022-06-07 RX ORDER — BUPROPION HYDROCHLORIDE 300 MG/1
TABLET ORAL
Qty: 30 TABLET | Refills: 0 | Status: SHIPPED | OUTPATIENT
Start: 2022-06-07 | End: 2022-06-08

## 2022-06-30 PROBLEM — I10 ESSENTIAL (PRIMARY) HYPERTENSION: Status: ACTIVE | Noted: 2022-06-30

## 2022-06-30 PROBLEM — F32.A DEPRESSION: Status: ACTIVE | Noted: 2022-06-30

## 2022-06-30 PROBLEM — Z00.00 WELLNESS EXAMINATION: Status: ACTIVE | Noted: 2022-06-30

## 2022-07-01 PROBLEM — M48.02 CERVICAL STENOSIS OF SPINAL CANAL: Status: ACTIVE | Noted: 2022-07-01

## 2022-07-27 PROBLEM — Z72.0 TOBACCO USE: Status: ACTIVE | Noted: 2022-07-27

## 2022-07-28 PROBLEM — Z12.11 ENCOUNTER FOR SCREENING COLONOSCOPY: Status: ACTIVE | Noted: 2022-06-30

## 2022-07-28 PROBLEM — Z23 IMMUNIZATION DUE: Status: ACTIVE | Noted: 2022-07-28

## 2022-07-28 PROBLEM — Z80.0 FAMILY HISTORY OF COLON CANCER REQUIRING SCREENING COLONOSCOPY: Status: ACTIVE | Noted: 2022-07-28

## 2022-10-31 PROBLEM — Z00.00 ENCOUNTER FOR WELLNESS EXAMINATION: Status: RESOLVED | Noted: 2022-06-30 | Resolved: 2022-10-31

## 2023-08-09 PROBLEM — Z12.2 SCREENING FOR MALIGNANT NEOPLASM OF RESPIRATORY ORGAN: Status: ACTIVE | Noted: 2023-08-09

## 2023-08-09 PROBLEM — F32.A DEPRESSION: Status: RESOLVED | Noted: 2023-08-09 | Resolved: 2023-08-09

## 2023-08-09 PROBLEM — F32.A DEPRESSIVE DISORDER: Status: ACTIVE | Noted: 2023-08-09

## 2023-08-09 PROBLEM — F32.A DEPRESSION: Status: ACTIVE | Noted: 2023-08-09

## 2023-08-09 PROBLEM — E78.00 HYPERCHOLESTEREMIA: Status: ACTIVE | Noted: 2023-08-09

## 2023-08-09 PROBLEM — Z91.09 ENVIRONMENTAL ALLERGIES: Status: ACTIVE | Noted: 2023-08-09

## 2023-11-13 PROBLEM — Z00.00 ENCOUNTER FOR WELLNESS EXAMINATION: Status: RESOLVED | Noted: 2022-06-30 | Resolved: 2023-11-13

## 2024-01-31 ENCOUNTER — PATIENT OUTREACH (OUTPATIENT)
Dept: ADMINISTRATIVE | Facility: HOSPITAL | Age: 59
End: 2024-01-31
Payer: COMMERCIAL

## 2024-01-31 NOTE — LETTER
"  This communication is flagged as high priority.        AUTHORIZATION FOR RELEASE OF   CONFIDENTIAL INFORMATION    Dear Dayanna Calderon,    We are seeing Jhoana Jin, date of birth 1965, in the clinic at [facility]. Costa Alvares MD is the patient's PCP. Jhoana Jin has an outstanding lab/procedure at the time we reviewed her chart. In order to help keep her health information updated, she has authorized us to request the following medical record(s):        (xx  )  MAMMOGRAM                                      (  )  COLONOSCOPY      (  )  PAP SMEAR                                          (  )  OUTSIDE LAB RESULTS     (  )  DEXA SCAN                                          (  )  EYE EXAM            (  )  FOOT EXAM                                          (  )  ENTIRE RECORD     (  )  OUTSIDE IMMUNIZATIONS                 (  )  _______________         Please fax records to [facility], Manuel, Chad B., MD,  [phone]  Attn: Martha      If you have any questions, please contact Perla Ross" Evangelina ,Care Coordinator @ [phone]       Patient Name: Jhoana Jin  : 1965  Patient Phone #: [phone]     "

## 2024-02-08 ENCOUNTER — OFFICE VISIT (OUTPATIENT)
Dept: PRIMARY CARE CLINIC | Facility: CLINIC | Age: 59
End: 2024-02-08
Payer: COMMERCIAL

## 2024-02-08 VITALS
SYSTOLIC BLOOD PRESSURE: 110 MMHG | HEIGHT: 61 IN | WEIGHT: 131.38 LBS | BODY MASS INDEX: 24.8 KG/M2 | OXYGEN SATURATION: 96 % | HEART RATE: 96 BPM | RESPIRATION RATE: 18 BRPM | DIASTOLIC BLOOD PRESSURE: 68 MMHG

## 2024-02-08 DIAGNOSIS — I10 ESSENTIAL (PRIMARY) HYPERTENSION: Primary | ICD-10-CM

## 2024-02-08 DIAGNOSIS — Z72.0 TOBACCO USE: ICD-10-CM

## 2024-02-08 DIAGNOSIS — E78.00 HYPERCHOLESTEREMIA: ICD-10-CM

## 2024-02-08 DIAGNOSIS — Z12.31 SCREENING MAMMOGRAM FOR BREAST CANCER: ICD-10-CM

## 2024-02-08 DIAGNOSIS — F32.A DEPRESSIVE DISORDER: ICD-10-CM

## 2024-02-08 PROCEDURE — 1160F RVW MEDS BY RX/DR IN RCRD: CPT | Mod: CPTII,,, | Performed by: FAMILY MEDICINE

## 2024-02-08 PROCEDURE — 4010F ACE/ARB THERAPY RXD/TAKEN: CPT | Mod: CPTII,,, | Performed by: FAMILY MEDICINE

## 2024-02-08 PROCEDURE — 1159F MED LIST DOCD IN RCRD: CPT | Mod: CPTII,,, | Performed by: FAMILY MEDICINE

## 2024-02-08 PROCEDURE — 3074F SYST BP LT 130 MM HG: CPT | Mod: CPTII,,, | Performed by: FAMILY MEDICINE

## 2024-02-08 PROCEDURE — 3078F DIAST BP <80 MM HG: CPT | Mod: CPTII,,, | Performed by: FAMILY MEDICINE

## 2024-02-08 PROCEDURE — 99214 OFFICE O/P EST MOD 30 MIN: CPT | Mod: ,,, | Performed by: FAMILY MEDICINE

## 2024-02-08 PROCEDURE — 3008F BODY MASS INDEX DOCD: CPT | Mod: CPTII,,, | Performed by: FAMILY MEDICINE

## 2024-02-08 RX ORDER — ROSUVASTATIN CALCIUM 20 MG/1
20 TABLET, COATED ORAL DAILY
Qty: 30 TABLET | Refills: 2 | Status: SHIPPED | OUTPATIENT
Start: 2024-02-08 | End: 2024-05-08

## 2024-02-08 NOTE — PROGRESS NOTES
.Follow-up (6 month f/u /Home BP running good/Wellbutrin helping depression )         HPI:    Patient presents for 6 month recheck of depression and hypertension.  She has been feeling well.  She has been sleeping well.  Her appetite has been normal.    Her depression has been doing well on depression.  She reports occasional anxiety.    Current Outpatient Medications:     buPROPion (WELLBUTRIN XL) 300 MG 24 hr tablet, Take 1 tablet (300 mg total) by mouth once daily., Disp: 30 tablet, Rfl: 11    lisinopriL-hydrochlorothiazide (PRINZIDE,ZESTORETIC) 10-12.5 mg per tablet, Take 1 tablet by mouth once daily., Disp: 30 tablet, Rfl: 11    rosuvastatin (CRESTOR) 20 MG tablet, Take 1 tablet (20 mg total) by mouth once daily., Disp: 30 tablet, Rfl: 2      ROS:    Patient had a rash in her back that was not very itchy.  She went to urgent care and got a steroid shot and prednisone.  Patient has started Lipitor one-month prior to rash.  Not long after rash started, she began with abdominal pain and fever.    She was seen in the ED D on 01/03/2024 complaining of lower abdominal pain.  White count was 21.3, hemoglobin 14.2, hematocrit 45.1, MCV 92 and platelets 288.  CMP was normal other than CO2 18 glucose 119.    CT abdomen pelvis with IV contrast showed sigmoid colon mural thickening with pericolic fat stranding suggestive acute diverticulitis.  No abscess formation or extraluminal air or fluid.  There were fluid-filled small bowel loops suggestive of ileus.  Patient discharged home on Cipro, Flagyl, Norco and Zofran.  Patient's was seen at urgent care on 01/06/2024 complaining of worsening rash.  Cipro was stopped and she prescribed Augmentin and advised to continue the Flagyl.  She was prescribed Xyzal and triamcinolone lotion for the rash.  She saw Dr. Brasher for her rash; Dr. Brasher felt she had was having a drug/medication induced rash.  Subsequently, we discontinued her Lipitor.  Patient followed up with Dr. Castelan  Topher in regards to her diverticulitis.    PE:    ..  Vitals:    02/08/24 1511   BP: 110/68   Pulse: 96   Resp: 18        General:  She is well-developed well-nourished white female in no apparent distress.  She is alert and oriented.    Chest: Clear to auscultation bilaterally.    CV: Regular rate rhythm without murmurs rubs gallops        1. Essential (primary) hypertension  Overview:  Well controlled; continue current medication.  Refills sent.    Limit sodium consumption.    Decrease/stop smoking.    02/08/2024:  Her blood pressure is well controlled; continue current medication.  Limit sodium consumption.  Decrease/stop smoking.      2. Depressive disorder  Overview:  Patient states her depression has been doing well on Wellbutrin  till recently; she has noted increased depression and sadness recently.  She has occasional crying spells.  She denies any suicidal or harmful thoughts.  She used to be on Lexapro; will resume at 10 mg daily.  She is to call in 1 month with an update.  ER precautions given.  She is also reporting increased anxiety; Lexapro should also help with this.    02/08/2024:  Patient states her depression is doing well.    Continue Wellbutrin.    Patient denies any sadness, depression, crying spells or suicidal thoughts.    ER precautions given.      3. Tobacco use  Overview:  Patient encouraged to decrease/stop usage.    Long-term risks of tobacco usage discussed with patient.  Schedule low-dose CT scan of chest for lung cancer screening.    02/08/2024: Patient encouraged to decrease/stop usage.    Long-term risks of tobacco usage discussed with patient.  4 minutes spent discussing cessation.      4. Screening mammogram for breast cancer  Overview:  Order for mammogram sent to [facility].    Orders:  -     Mammo Digital Screening Bilat; Future; Expected date: 02/15/2024    5. Hypercholesteremia  Overview:  Recent lipid profile at wellness fair at work revealed a total  cholesterol 265, HDL 46, triglycerides 154 and .  Patient previously took a cholesterol medication; it caused her to cramps.    Start atorvastatin 40 mg daily.    She is to take Qunol Co Q10 daily while she is on cholesterol medication.  Recheck lipid profile in 3 months; order placed in chart.    02/08/2024:  Patient discontinued atorvastatin secondary to allergic skin reaction.  Patient was previously on a statin which caused her toes to cramps.    Start rosuvastatin 20 mg daily.  If patient tolerates, will recheck lipid profile in 2-3 months.  Continue low-cholesterol/low-fat diet.      Other orders  -     rosuvastatin (CRESTOR) 20 MG tablet; Take 1 tablet (20 mg total) by mouth once daily.  Dispense: 30 tablet; Refill: 2              ..Follow up in about 6 months (around 8/8/2024) for Wellness exam fasting labs.

## 2024-08-01 DIAGNOSIS — Z00.00 WELLNESS EXAMINATION: Primary | ICD-10-CM

## 2024-08-08 ENCOUNTER — OFFICE VISIT (OUTPATIENT)
Dept: PRIMARY CARE CLINIC | Facility: CLINIC | Age: 59
End: 2024-08-08
Payer: COMMERCIAL

## 2024-08-08 VITALS
HEART RATE: 89 BPM | WEIGHT: 131.69 LBS | HEIGHT: 61 IN | TEMPERATURE: 98 F | OXYGEN SATURATION: 98 % | SYSTOLIC BLOOD PRESSURE: 112 MMHG | BODY MASS INDEX: 24.86 KG/M2 | DIASTOLIC BLOOD PRESSURE: 78 MMHG

## 2024-08-08 DIAGNOSIS — Z00.00 ENCOUNTER FOR WELLNESS EXAMINATION: Primary | ICD-10-CM

## 2024-08-08 DIAGNOSIS — Z23 IMMUNIZATION DUE: ICD-10-CM

## 2024-08-08 DIAGNOSIS — M48.02 CERVICAL STENOSIS OF SPINAL CANAL: ICD-10-CM

## 2024-08-08 DIAGNOSIS — Z12.2 SCREENING FOR MALIGNANT NEOPLASM OF RESPIRATORY ORGAN: ICD-10-CM

## 2024-08-08 DIAGNOSIS — I10 ESSENTIAL (PRIMARY) HYPERTENSION: ICD-10-CM

## 2024-08-08 DIAGNOSIS — Z13.1 SCREENING FOR DIABETES MELLITUS: ICD-10-CM

## 2024-08-08 DIAGNOSIS — R53.82 CHRONIC FATIGUE: ICD-10-CM

## 2024-08-08 DIAGNOSIS — Z72.0 TOBACCO USE: ICD-10-CM

## 2024-08-08 DIAGNOSIS — F32.A DEPRESSIVE DISORDER: ICD-10-CM

## 2024-08-08 DIAGNOSIS — F33.42 RECURRENT MAJOR DEPRESSIVE DISORDER, IN FULL REMISSION: ICD-10-CM

## 2024-08-08 DIAGNOSIS — E78.00 HYPERCHOLESTEREMIA: ICD-10-CM

## 2024-08-08 DIAGNOSIS — Z91.09 ENVIRONMENTAL ALLERGIES: ICD-10-CM

## 2024-08-08 PROCEDURE — 1159F MED LIST DOCD IN RCRD: CPT | Mod: CPTII,,, | Performed by: FAMILY MEDICINE

## 2024-08-08 PROCEDURE — 4010F ACE/ARB THERAPY RXD/TAKEN: CPT | Mod: CPTII,,, | Performed by: FAMILY MEDICINE

## 2024-08-08 PROCEDURE — 1160F RVW MEDS BY RX/DR IN RCRD: CPT | Mod: CPTII,,, | Performed by: FAMILY MEDICINE

## 2024-08-08 PROCEDURE — 99396 PREV VISIT EST AGE 40-64: CPT | Mod: 25,,, | Performed by: FAMILY MEDICINE

## 2024-08-08 PROCEDURE — 3078F DIAST BP <80 MM HG: CPT | Mod: CPTII,,, | Performed by: FAMILY MEDICINE

## 2024-08-08 PROCEDURE — 3074F SYST BP LT 130 MM HG: CPT | Mod: CPTII,,, | Performed by: FAMILY MEDICINE

## 2024-08-08 PROCEDURE — 99406 BEHAV CHNG SMOKING 3-10 MIN: CPT | Mod: ,,, | Performed by: FAMILY MEDICINE

## 2024-08-08 PROCEDURE — 3008F BODY MASS INDEX DOCD: CPT | Mod: CPTII,,, | Performed by: FAMILY MEDICINE

## 2024-08-08 RX ORDER — ATORVASTATIN CALCIUM 40 MG/1
40 TABLET, FILM COATED ORAL DAILY
Qty: 30 TABLET | Refills: 11 | Status: SHIPPED | OUTPATIENT
Start: 2024-08-08 | End: 2025-08-03

## 2024-08-08 RX ORDER — ATORVASTATIN CALCIUM 40 MG/1
1 TABLET, FILM COATED ORAL DAILY
COMMUNITY
End: 2024-08-08 | Stop reason: SDUPTHER

## 2024-08-08 RX ORDER — BUPROPION HYDROCHLORIDE 300 MG/1
300 TABLET ORAL DAILY
Qty: 30 TABLET | Refills: 11 | Status: SHIPPED | OUTPATIENT
Start: 2024-08-08

## 2024-08-08 RX ORDER — VARENICLINE TARTRATE 0.5 (11)-1
KIT ORAL
Qty: 1 EACH | Refills: 0 | Status: SHIPPED | OUTPATIENT
Start: 2024-08-08

## 2024-08-08 RX ORDER — LISINOPRIL AND HYDROCHLOROTHIAZIDE 10; 12.5 MG/1; MG/1
1 TABLET ORAL DAILY
Qty: 30 TABLET | Refills: 11 | Status: SHIPPED | OUTPATIENT
Start: 2024-08-08

## 2024-08-08 RX ORDER — BUPROPION HYDROCHLORIDE 300 MG/1
300 TABLET ORAL DAILY
Qty: 30 TABLET | Refills: 11 | Status: SHIPPED | OUTPATIENT
Start: 2024-08-08 | End: 2024-08-08 | Stop reason: SDUPTHER

## 2024-08-14 DIAGNOSIS — E78.00 HYPERCHOLESTEREMIA: Primary | ICD-10-CM

## 2024-08-14 RX ORDER — ROSUVASTATIN CALCIUM 20 MG/1
20 TABLET, COATED ORAL
Qty: 30 TABLET | Refills: 2 | Status: SHIPPED | OUTPATIENT
Start: 2024-08-14

## 2025-01-17 ENCOUNTER — TELEPHONE (OUTPATIENT)
Dept: PRIMARY CARE CLINIC | Facility: CLINIC | Age: 60
End: 2025-01-17
Payer: COMMERCIAL

## 2025-01-17 DIAGNOSIS — E78.00 HYPERCHOLESTEREMIA: ICD-10-CM

## 2025-01-17 RX ORDER — ROSUVASTATIN CALCIUM 20 MG/1
20 TABLET, COATED ORAL DAILY
Qty: 90 TABLET | Refills: 3 | Status: SHIPPED | OUTPATIENT
Start: 2025-01-17

## 2025-01-17 RX ORDER — ROSUVASTATIN CALCIUM 20 MG/1
20 TABLET, COATED ORAL
Qty: 30 TABLET | Refills: 2 | Status: SHIPPED | OUTPATIENT
Start: 2025-01-17 | End: 2025-01-17 | Stop reason: SDUPTHER

## 2025-01-17 NOTE — TELEPHONE ENCOUNTER
----- Message from Margie sent at 1/17/2025  8:28 AM CST -----  .Who Called: Jhoana LYNCH [place]    Refill or New Rx:Refill  RX Name and Strength: Disp Refills Start End MANUELA  rosuvastatin (CRESTOR) 20 MG tablet           How is the patient currently taking it? (ex. 1XDay):1x day  Is this a 30 day or 90 day RX:30  Local or Mail Order:local  List of preferred pharmacies on file (remove unneeded): [unfilled]  If different Pharmacy is requested, enter Pharmacy information here including location and phone number: same   Ordering Provider:Giorgio      Preferred Method of Contact: Phone Call  Patient's Preferred Phone Number on File: [phone]   Best Call Back Number, if different:  Additional Information: refills

## 2025-08-02 DIAGNOSIS — I10 ESSENTIAL (PRIMARY) HYPERTENSION: ICD-10-CM

## 2025-08-02 DIAGNOSIS — Z13.1 SCREENING FOR DIABETES MELLITUS: ICD-10-CM

## 2025-08-02 DIAGNOSIS — R53.82 CHRONIC FATIGUE: ICD-10-CM

## 2025-08-02 DIAGNOSIS — E78.00 HYPERCHOLESTEREMIA: ICD-10-CM

## 2025-08-02 DIAGNOSIS — Z00.00 ENCOUNTER FOR WELLNESS EXAMINATION: Primary | ICD-10-CM

## 2025-08-11 ENCOUNTER — LAB VISIT (OUTPATIENT)
Dept: LAB | Facility: HOSPITAL | Age: 60
End: 2025-08-11
Attending: FAMILY MEDICINE
Payer: COMMERCIAL

## 2025-08-11 ENCOUNTER — TELEPHONE (OUTPATIENT)
Dept: PRIMARY CARE CLINIC | Facility: CLINIC | Age: 60
End: 2025-08-11
Payer: COMMERCIAL

## 2025-08-11 DIAGNOSIS — Z00.00 ENCOUNTER FOR WELLNESS EXAMINATION: ICD-10-CM

## 2025-08-11 DIAGNOSIS — E78.00 HYPERCHOLESTEREMIA: ICD-10-CM

## 2025-08-11 DIAGNOSIS — I10 ESSENTIAL (PRIMARY) HYPERTENSION: ICD-10-CM

## 2025-08-11 DIAGNOSIS — Z13.1 SCREENING FOR DIABETES MELLITUS: ICD-10-CM

## 2025-08-11 DIAGNOSIS — R53.82 CHRONIC FATIGUE: ICD-10-CM

## 2025-08-11 LAB
ALBUMIN SERPL-MCNC: 3.8 G/DL (ref 3.5–5)
ALBUMIN/GLOB SERPL: 1.3 RATIO (ref 1.1–2)
ALP SERPL-CCNC: 102 UNIT/L (ref 40–150)
ALT SERPL-CCNC: 22 UNIT/L (ref 0–55)
ANION GAP SERPL CALC-SCNC: 8 MEQ/L
AST SERPL-CCNC: 21 UNIT/L (ref 11–45)
BACTERIA #/AREA URNS AUTO: ABNORMAL /HPF
BASOPHILS # BLD AUTO: 0.02 X10(3)/MCL
BASOPHILS NFR BLD AUTO: 0.3 %
BILIRUB SERPL-MCNC: 0.6 MG/DL
BILIRUB UR QL STRIP.AUTO: NEGATIVE
BUN SERPL-MCNC: 19.2 MG/DL (ref 9.8–20.1)
CALCIUM SERPL-MCNC: 9.3 MG/DL (ref 8.4–10.2)
CHLORIDE SERPL-SCNC: 108 MMOL/L (ref 98–107)
CHOLEST SERPL-MCNC: 131 MG/DL
CHOLEST/HDLC SERPL: 3 {RATIO} (ref 0–5)
CLARITY UR: CLEAR
CO2 SERPL-SCNC: 27 MMOL/L (ref 22–29)
COLOR UR AUTO: YELLOW
CREAT SERPL-MCNC: 1.27 MG/DL (ref 0.55–1.02)
CREAT/UREA NIT SERPL: 15
EOSINOPHIL # BLD AUTO: 0.08 X10(3)/MCL (ref 0–0.9)
EOSINOPHIL NFR BLD AUTO: 1.2 %
ERYTHROCYTE [DISTWIDTH] IN BLOOD BY AUTOMATED COUNT: 14 % (ref 11.5–17)
EST. AVERAGE GLUCOSE BLD GHB EST-MCNC: 111.2 MG/DL
GFR SERPLBLD CREATININE-BSD FMLA CKD-EPI: 49 ML/MIN/1.73/M2
GLOBULIN SER-MCNC: 2.9 GM/DL (ref 2.4–3.5)
GLUCOSE SERPL-MCNC: 111 MG/DL (ref 74–100)
GLUCOSE UR QL STRIP: NORMAL
HBA1C MFR BLD: 5.5 %
HCT VFR BLD AUTO: 42.4 % (ref 37–47)
HDLC SERPL-MCNC: 44 MG/DL (ref 35–60)
HGB BLD-MCNC: 13.6 G/DL (ref 12–16)
HGB UR QL STRIP: ABNORMAL
IMM GRANULOCYTES # BLD AUTO: 0.01 X10(3)/MCL (ref 0–0.04)
IMM GRANULOCYTES NFR BLD AUTO: 0.2 %
KETONES UR QL STRIP: NEGATIVE
LDLC SERPL CALC-MCNC: 64 MG/DL (ref 50–140)
LEUKOCYTE ESTERASE UR QL STRIP: NEGATIVE
LYMPHOCYTES # BLD AUTO: 1.79 X10(3)/MCL (ref 0.6–4.6)
LYMPHOCYTES NFR BLD AUTO: 27.8 %
MCH RBC QN AUTO: 28.7 PG (ref 27–31)
MCHC RBC AUTO-ENTMCNC: 32.1 G/DL (ref 33–36)
MCV RBC AUTO: 89.5 FL (ref 80–94)
MONOCYTES # BLD AUTO: 0.41 X10(3)/MCL (ref 0.1–1.3)
MONOCYTES NFR BLD AUTO: 6.4 %
MUCOUS THREADS URNS QL MICRO: ABNORMAL /LPF
NEUTROPHILS # BLD AUTO: 4.14 X10(3)/MCL (ref 2.1–9.2)
NEUTROPHILS NFR BLD AUTO: 64.1 %
NITRITE UR QL STRIP: NEGATIVE
NRBC BLD AUTO-RTO: 0 %
PH UR STRIP: 6 [PH]
PLATELET # BLD AUTO: 195 X10(3)/MCL (ref 130–400)
PMV BLD AUTO: 11.4 FL (ref 7.4–10.4)
POTASSIUM SERPL-SCNC: 4.4 MMOL/L (ref 3.5–5.1)
PROT SERPL-MCNC: 6.7 GM/DL (ref 6.4–8.3)
PROT UR QL STRIP: NEGATIVE
RBC # BLD AUTO: 4.74 X10(6)/MCL (ref 4.2–5.4)
RBC #/AREA URNS AUTO: ABNORMAL /HPF
SODIUM SERPL-SCNC: 143 MMOL/L (ref 136–145)
SP GR UR STRIP.AUTO: 1.02 (ref 1–1.03)
SQUAMOUS #/AREA URNS LPF: ABNORMAL /HPF
TRIGL SERPL-MCNC: 113 MG/DL (ref 37–140)
TSH SERPL-ACNC: 1.75 UIU/ML (ref 0.35–4.94)
UROBILINOGEN UR STRIP-ACNC: NORMAL
VLDLC SERPL CALC-MCNC: 23 MG/DL
WBC # BLD AUTO: 6.45 X10(3)/MCL (ref 4.5–11.5)
WBC #/AREA URNS AUTO: ABNORMAL /HPF

## 2025-08-11 PROCEDURE — 80061 LIPID PANEL: CPT

## 2025-08-11 PROCEDURE — 83036 HEMOGLOBIN GLYCOSYLATED A1C: CPT

## 2025-08-11 PROCEDURE — 84443 ASSAY THYROID STIM HORMONE: CPT

## 2025-08-11 PROCEDURE — 81015 MICROSCOPIC EXAM OF URINE: CPT

## 2025-08-11 PROCEDURE — 36415 COLL VENOUS BLD VENIPUNCTURE: CPT

## 2025-08-11 PROCEDURE — 85025 COMPLETE CBC W/AUTO DIFF WBC: CPT

## 2025-08-11 PROCEDURE — 80053 COMPREHEN METABOLIC PANEL: CPT

## 2025-08-13 ENCOUNTER — OFFICE VISIT (OUTPATIENT)
Dept: PRIMARY CARE CLINIC | Facility: CLINIC | Age: 60
End: 2025-08-13
Payer: COMMERCIAL

## 2025-08-13 VITALS
DIASTOLIC BLOOD PRESSURE: 78 MMHG | OXYGEN SATURATION: 95 % | SYSTOLIC BLOOD PRESSURE: 119 MMHG | BODY MASS INDEX: 25.49 KG/M2 | HEART RATE: 80 BPM | TEMPERATURE: 98 F | HEIGHT: 61 IN | WEIGHT: 135 LBS

## 2025-08-13 DIAGNOSIS — N18.31 CHRONIC KIDNEY DISEASE, STAGE 3A: ICD-10-CM

## 2025-08-13 DIAGNOSIS — Z12.31 SCREENING MAMMOGRAM FOR BREAST CANCER: ICD-10-CM

## 2025-08-13 DIAGNOSIS — R31.21 ASYMPTOMATIC MICROSCOPIC HEMATURIA: ICD-10-CM

## 2025-08-13 DIAGNOSIS — Z00.00 ENCOUNTER FOR WELLNESS EXAMINATION: Primary | ICD-10-CM

## 2025-08-13 DIAGNOSIS — I10 ESSENTIAL (PRIMARY) HYPERTENSION: ICD-10-CM

## 2025-08-13 DIAGNOSIS — F41.9 ANXIETY: ICD-10-CM

## 2025-08-13 DIAGNOSIS — Z91.09 ENVIRONMENTAL ALLERGIES: ICD-10-CM

## 2025-08-13 DIAGNOSIS — M48.02 CERVICAL STENOSIS OF SPINAL CANAL: ICD-10-CM

## 2025-08-13 DIAGNOSIS — Z12.2 SCREENING FOR MALIGNANT NEOPLASM OF RESPIRATORY ORGAN: ICD-10-CM

## 2025-08-13 DIAGNOSIS — Z13.1 SCREENING FOR DIABETES MELLITUS: ICD-10-CM

## 2025-08-13 DIAGNOSIS — F33.42 RECURRENT MAJOR DEPRESSIVE DISORDER, IN FULL REMISSION: ICD-10-CM

## 2025-08-13 DIAGNOSIS — Z72.0 TOBACCO USE: ICD-10-CM

## 2025-08-13 DIAGNOSIS — E78.00 HYPERCHOLESTEREMIA: ICD-10-CM

## 2025-08-13 DIAGNOSIS — R53.82 CHRONIC FATIGUE: ICD-10-CM

## 2025-08-13 RX ORDER — LISINOPRIL AND HYDROCHLOROTHIAZIDE 10; 12.5 MG/1; MG/1
1 TABLET ORAL DAILY
Qty: 90 TABLET | Refills: 3 | Status: SHIPPED | OUTPATIENT
Start: 2025-08-13 | End: 2026-08-08

## 2025-08-13 RX ORDER — BUPROPION HYDROCHLORIDE 300 MG/1
300 TABLET ORAL DAILY
Qty: 90 TABLET | Refills: 3 | Status: SHIPPED | OUTPATIENT
Start: 2025-08-13 | End: 2026-08-08

## 2025-08-13 RX ORDER — BUPROPION HYDROCHLORIDE 300 MG/1
300 TABLET ORAL DAILY
Qty: 90 TABLET | Refills: 3 | Status: SHIPPED | OUTPATIENT
Start: 2025-08-13 | End: 2025-08-13 | Stop reason: SDUPTHER

## 2025-08-13 RX ORDER — BUSPIRONE HYDROCHLORIDE 10 MG/1
10 TABLET ORAL 2 TIMES DAILY
Qty: 60 TABLET | Refills: 1 | Status: SHIPPED | OUTPATIENT
Start: 2025-08-13 | End: 2025-10-12

## 2025-08-13 RX ORDER — BUSPIRONE HYDROCHLORIDE 10 MG/1
10 TABLET ORAL 3 TIMES DAILY
Qty: 60 TABLET | Refills: 1 | Status: SHIPPED | OUTPATIENT
Start: 2025-08-13 | End: 2025-08-13 | Stop reason: SDUPTHER

## 2025-08-13 RX ORDER — ROSUVASTATIN CALCIUM 20 MG/1
20 TABLET, COATED ORAL DAILY
Qty: 90 TABLET | Refills: 3 | Status: SHIPPED | OUTPATIENT
Start: 2025-08-13 | End: 2026-08-08

## 2025-08-14 ENCOUNTER — TELEPHONE (OUTPATIENT)
Dept: PRIMARY CARE CLINIC | Facility: CLINIC | Age: 60
End: 2025-08-14
Payer: COMMERCIAL

## 2025-08-14 ENCOUNTER — DOCUMENTATION ONLY (OUTPATIENT)
Dept: PRIMARY CARE CLINIC | Facility: CLINIC | Age: 60
End: 2025-08-14
Payer: COMMERCIAL